# Patient Record
Sex: FEMALE | Race: WHITE | NOT HISPANIC OR LATINO | URBAN - METROPOLITAN AREA
[De-identification: names, ages, dates, MRNs, and addresses within clinical notes are randomized per-mention and may not be internally consistent; named-entity substitution may affect disease eponyms.]

---

## 2017-01-31 ENCOUNTER — EMERGENCY (EMERGENCY)
Facility: HOSPITAL | Age: 47
LOS: 1 days | End: 2017-01-31
Payer: COMMERCIAL

## 2017-01-31 PROCEDURE — 99285 EMERGENCY DEPT VISIT HI MDM: CPT

## 2017-01-31 PROCEDURE — 71010: CPT | Mod: 26

## 2017-12-17 ENCOUNTER — TRANSCRIPTION ENCOUNTER (OUTPATIENT)
Age: 47
End: 2017-12-17

## 2019-04-11 PROBLEM — Z00.00 ENCOUNTER FOR PREVENTIVE HEALTH EXAMINATION: Status: ACTIVE | Noted: 2019-04-11

## 2019-04-12 ENCOUNTER — NON-APPOINTMENT (OUTPATIENT)
Age: 49
End: 2019-04-12

## 2019-04-12 ENCOUNTER — APPOINTMENT (OUTPATIENT)
Dept: CARDIOLOGY | Facility: CLINIC | Age: 49
End: 2019-04-12
Payer: COMMERCIAL

## 2019-04-12 VITALS
BODY MASS INDEX: 24.73 KG/M2 | WEIGHT: 131 LBS | SYSTOLIC BLOOD PRESSURE: 138 MMHG | HEART RATE: 95 BPM | HEIGHT: 61 IN | DIASTOLIC BLOOD PRESSURE: 82 MMHG

## 2019-04-12 DIAGNOSIS — Z78.9 OTHER SPECIFIED HEALTH STATUS: ICD-10-CM

## 2019-04-12 DIAGNOSIS — Z82.49 FAMILY HISTORY OF ISCHEMIC HEART DISEASE AND OTHER DISEASES OF THE CIRCULATORY SYSTEM: ICD-10-CM

## 2019-04-12 DIAGNOSIS — Z85.71 PERSONAL HISTORY OF HODGKIN LYMPHOMA: ICD-10-CM

## 2019-04-12 DIAGNOSIS — Z83.438 FAMILY HISTORY OF OTHER DISORDER OF LIPOPROTEIN METABOLISM AND OTHER LIPIDEMIA: ICD-10-CM

## 2019-04-12 DIAGNOSIS — Z92.3 PERSONAL HISTORY OF IRRADIATION: ICD-10-CM

## 2019-04-12 DIAGNOSIS — Z85.818 PERSONAL HISTORY OF MALIGNANT NEOPLASM OF OTHER SITES OF LIP, ORAL CAVITY, AND PHARYNX: ICD-10-CM

## 2019-04-12 PROCEDURE — 99243 OFF/OP CNSLTJ NEW/EST LOW 30: CPT

## 2019-04-12 PROCEDURE — 93000 ELECTROCARDIOGRAM COMPLETE: CPT

## 2019-04-12 RX ORDER — MULTIVITAMIN
TABLET ORAL
Refills: 0 | Status: ACTIVE | COMMUNITY

## 2019-04-12 RX ORDER — LORAZEPAM 0.5 MG/1
0.5 TABLET ORAL 3 TIMES DAILY
Refills: 0 | Status: ACTIVE | COMMUNITY

## 2019-04-12 RX ORDER — GLUC/MSM/COLGN2/HYAL/ANTIARTH3 375-375-20
TABLET ORAL
Refills: 0 | Status: ACTIVE | COMMUNITY

## 2019-04-12 NOTE — DISCUSSION/SUMMARY
[FreeTextEntry1] : Eliza is a 49-year-old female with medical history detailed above and active medical issues including:\par \par - Recurrent chest pain, dyspnea on exertion, multiple CAD risk factors. Patient will have noninvasive testing with exercise stress echo to assess for obstructive CAD, HR and BP response, exercise-induced arrhythmia, 2DEcho for LVEF, structural heart disease, carotid and abdominal ultrasound to assess for obstructive PAD.\par \par - History of sinus tachycardia with normal cardiology evaluation 2013 including echocardiogram, Holter monitor\par \par - History of Hodgkin's lymphoma radiation therapy 1990, tonsillar cancer surgery and radiation therapy followed by oncologist\par \par - Aranda's esophagus scheduled for upper endoscopy Dr Grace 4/23/19 [facility]\par \par Patient will be seen in cardiology follow-up after noninvasive testing.\par \par Advised patient to follow active lifestyle with regular cardiovascular exercise. Patient educated on lifestyle and diet modification with low sodium low fat diet and avoidance of excessive alcohol. Patient is aware to call with any symptoms or concerns. \par \par Eliza will followup with Dr Rogerio Arauz for primary care. \par \par

## 2019-04-12 NOTE — DISCUSSION/SUMMARY
[FreeTextEntry1] : Eliza is a 49-year-old female with medical history detailed above and active medical issues including:\par \par - Recurrent chest pain, dyspnea on exertion, multiple CAD risk factors. Patient will have noninvasive testing with exercise stress echo to assess for obstructive CAD, HR and BP response, exercise-induced arrhythmia, 2DEcho for LVEF, structural heart disease, carotid and abdominal ultrasound to assess for obstructive PAD.\par \par - History of sinus tachycardia with normal cardiology evaluation 2013 including echocardiogram, Holter monitor\par \par - History of Hodgkin's lymphoma radiation therapy 1990, tonsillar cancer surgery and radiation therapy followed by oncologist\par \par - Aranda's esophagus scheduled for upper endoscopy Dr Grace 4/23/19 [facility]\par \par Patient will be seen in cardiology follow-up after noninvasive testing.\par \par Advised patient to follow active lifestyle with regular cardiovascular exercise. Patient educated on lifestyle and diet modification with low sodium low fat diet and avoidance of excessive alcohol. Patient is aware to call with any symptoms or concerns. \par \par Eliza will followup with Dr Rogerio Arauz for primary care. \par \par

## 2019-04-12 NOTE — PHYSICAL EXAM
[Well Groomed] : well groomed [General Appearance - Well Developed] : well developed [Normal Appearance] : normal appearance [General Appearance - Well Nourished] : well nourished [No Deformities] : no deformities [General Appearance - In No Acute Distress] : no acute distress [Normal Conjunctiva] : the conjunctiva exhibited no abnormalities [Eyelids - No Xanthelasma] : the eyelids demonstrated no xanthelasmas [No Oral Pallor] : no oral pallor [Normal Oral Mucosa] : normal oral mucosa [No Oral Cyanosis] : no oral cyanosis [Normal Jugular Venous A Waves Present] : normal jugular venous A waves present [No Jugular Venous Gusman A Waves] : no jugular venous gusman A waves [Normal Jugular Venous V Waves Present] : normal jugular venous V waves present [Heart Rate And Rhythm] : heart rate and rhythm were normal [Murmurs] : no murmurs present [Heart Sounds] : normal S1 and S2 [Respiration, Rhythm And Depth] : normal respiratory rhythm and effort [Exaggerated Use Of Accessory Muscles For Inspiration] : no accessory muscle use [Abdomen Tenderness] : non-tender [Auscultation Breath Sounds / Voice Sounds] : lungs were clear to auscultation bilaterally [Abdomen Soft] : soft [Abnormal Walk] : normal gait [Abdomen Mass (___ Cm)] : no abdominal mass palpated [Nail Clubbing] : no clubbing of the fingernails [Cyanosis, Localized] : no localized cyanosis [Gait - Sufficient For Exercise Testing] : the gait was sufficient for exercise testing [Petechial Hemorrhages (___cm)] : no petechial hemorrhages [No Venous Stasis] : no venous stasis [Skin Lesions] : no skin lesions [] : no rash [Skin Color & Pigmentation] : normal skin color and pigmentation [No Skin Ulcers] : no skin ulcer [No Xanthoma] : no  xanthoma was observed [Mood] : the mood was normal [Affect] : the affect was normal [Oriented To Time, Place, And Person] : oriented to person, place, and time [No Anxiety] : not feeling anxious

## 2019-04-12 NOTE — REVIEW OF SYSTEMS
[Dyspnea on exertion] : dyspnea during exertion [Chest  Pressure] : chest pressure [Shortness Of Breath] : shortness of breath [Leg Claudication] : no intermittent leg claudication [Lower Ext Edema] : no extremity edema [Chest Pain] : chest pain [Palpitations] : palpitations [Negative] : Endocrine

## 2019-04-12 NOTE — REASON FOR VISIT
[Consultation] : a consultation regarding [FreeTextEntry2] : chest pain, palpitations, sinus tachycardia [FreeTextEntry1] : Eliza is a 49-year-old female with history of sinus tachycardia, Hodgkin's lymphoma radiation therapy 1990, tonsillar cancer surgery and radiation therapy, family history premature CAD.\par \par Patient has dyspnea with moderate exertion.  Chest pain mild pressure sensation lasting several minutes at rest, nonradiating, nonreproducible.  Patient has occasional palpitations once or twice per week no associated symptoms.  Patient has occasional dizziness with head position consistent with vertigo.  Cardiovascular review of symptoms is negative for exertional chest pain or syncope.  No PND or orthopnea leg edema.  No bleeding or black stool.\par \par Patient is walking 20 minutes with exertional chest pain. \par \par Patient is consuming one caffeinated beverage per day and is maintaining adequate oral hydration. \par \par EKG 4/12/19 normal sinus rhythm normal tracing\par \par Echocardiogram August 2013 LV of 60%, mild diastolic dysfunction, mild AI, PI and TR, mild pulmonary hypertension, RVSP 39 mmHg

## 2019-05-03 ENCOUNTER — APPOINTMENT (OUTPATIENT)
Dept: CARDIOLOGY | Facility: CLINIC | Age: 49
End: 2019-05-03
Payer: COMMERCIAL

## 2019-05-03 PROCEDURE — 93880 EXTRACRANIAL BILAT STUDY: CPT

## 2019-05-03 PROCEDURE — 93979 VASCULAR STUDY: CPT

## 2019-05-03 PROCEDURE — 93306 TTE W/DOPPLER COMPLETE: CPT

## 2019-05-17 ENCOUNTER — TRANSCRIPTION ENCOUNTER (OUTPATIENT)
Age: 49
End: 2019-05-17

## 2019-05-24 ENCOUNTER — APPOINTMENT (OUTPATIENT)
Dept: CARDIOLOGY | Facility: CLINIC | Age: 49
End: 2019-05-24

## 2019-07-06 ENCOUNTER — INPATIENT (INPATIENT)
Facility: HOSPITAL | Age: 49
LOS: 4 days | Discharge: HOME | End: 2019-07-11
Attending: FAMILY MEDICINE | Admitting: FAMILY MEDICINE
Payer: COMMERCIAL

## 2019-07-06 ENCOUNTER — OUTPATIENT (OUTPATIENT)
Dept: OUTPATIENT SERVICES | Facility: HOSPITAL | Age: 49
LOS: 1 days | End: 2019-07-06

## 2019-07-06 PROCEDURE — 71046 X-RAY EXAM CHEST 2 VIEWS: CPT | Mod: 26

## 2019-07-06 PROCEDURE — 99285 EMERGENCY DEPT VISIT HI MDM: CPT

## 2019-07-07 ENCOUNTER — OUTPATIENT (OUTPATIENT)
Dept: OUTPATIENT SERVICES | Facility: HOSPITAL | Age: 49
LOS: 1 days | End: 2019-07-07

## 2019-07-07 PROCEDURE — 93010 ELECTROCARDIOGRAM REPORT: CPT

## 2019-07-08 ENCOUNTER — OUTPATIENT (OUTPATIENT)
Dept: OUTPATIENT SERVICES | Facility: HOSPITAL | Age: 49
LOS: 1 days | End: 2019-07-08

## 2019-07-08 PROCEDURE — 71275 CT ANGIOGRAPHY CHEST: CPT | Mod: 26

## 2019-07-09 ENCOUNTER — OUTPATIENT (OUTPATIENT)
Dept: OUTPATIENT SERVICES | Facility: HOSPITAL | Age: 49
LOS: 1 days | End: 2019-07-09

## 2019-07-10 ENCOUNTER — OUTPATIENT (OUTPATIENT)
Dept: OUTPATIENT SERVICES | Facility: HOSPITAL | Age: 49
LOS: 1 days | End: 2019-07-10

## 2019-07-11 ENCOUNTER — OUTPATIENT (OUTPATIENT)
Dept: OUTPATIENT SERVICES | Facility: HOSPITAL | Age: 49
LOS: 1 days | End: 2019-07-11

## 2020-01-06 ENCOUNTER — TRANSCRIPTION ENCOUNTER (OUTPATIENT)
Age: 50
End: 2020-01-06

## 2020-10-02 ENCOUNTER — NON-APPOINTMENT (OUTPATIENT)
Age: 50
End: 2020-10-02

## 2020-10-02 ENCOUNTER — APPOINTMENT (OUTPATIENT)
Dept: CARDIOLOGY | Facility: CLINIC | Age: 50
End: 2020-10-02
Payer: COMMERCIAL

## 2020-10-02 VITALS
WEIGHT: 133 LBS | BODY MASS INDEX: 24.48 KG/M2 | TEMPERATURE: 97.4 F | HEIGHT: 62 IN | HEART RATE: 104 BPM | DIASTOLIC BLOOD PRESSURE: 70 MMHG | SYSTOLIC BLOOD PRESSURE: 112 MMHG | OXYGEN SATURATION: 98 %

## 2020-10-02 PROCEDURE — 93000 ELECTROCARDIOGRAM COMPLETE: CPT

## 2020-10-02 PROCEDURE — 99214 OFFICE O/P EST MOD 30 MIN: CPT

## 2020-10-02 RX ORDER — ESCITALOPRAM OXALATE 10 MG/1
10 TABLET ORAL DAILY
Refills: 0 | Status: DISCONTINUED | COMMUNITY
End: 2020-10-02

## 2020-10-02 RX ORDER — MIRTAZAPINE 15 MG/1
15 TABLET, FILM COATED ORAL
Refills: 0 | Status: ACTIVE | COMMUNITY

## 2020-10-02 RX ORDER — OMEPRAZOLE 40 MG/1
40 CAPSULE, DELAYED RELEASE ORAL
Refills: 0 | Status: ACTIVE | COMMUNITY

## 2020-10-02 NOTE — REVIEW OF SYSTEMS
[Shortness Of Breath] : shortness of breath [Dyspnea on exertion] : dyspnea during exertion [Chest  Pressure] : chest pressure [Chest Pain] : chest pain [Palpitations] : palpitations [Negative] : Heme/Lymph [Lower Ext Edema] : no extremity edema [Leg Claudication] : no intermittent leg claudication

## 2020-10-02 NOTE — PHYSICAL EXAM
[General Appearance - Well Developed] : well developed [Normal Appearance] : normal appearance [Well Groomed] : well groomed [General Appearance - Well Nourished] : well nourished [No Deformities] : no deformities [General Appearance - In No Acute Distress] : no acute distress [Normal Conjunctiva] : the conjunctiva exhibited no abnormalities [Eyelids - No Xanthelasma] : the eyelids demonstrated no xanthelasmas [Normal Oral Mucosa] : normal oral mucosa [No Oral Pallor] : no oral pallor [No Oral Cyanosis] : no oral cyanosis [Normal Jugular Venous A Waves Present] : normal jugular venous A waves present [Normal Jugular Venous V Waves Present] : normal jugular venous V waves present [No Jugular Venous Gusman A Waves] : no jugular venous gusman A waves [Respiration, Rhythm And Depth] : normal respiratory rhythm and effort [Exaggerated Use Of Accessory Muscles For Inspiration] : no accessory muscle use [Auscultation Breath Sounds / Voice Sounds] : lungs were clear to auscultation bilaterally [Heart Rate And Rhythm] : heart rate and rhythm were normal [Heart Sounds] : normal S1 and S2 [Murmurs] : no murmurs present [Abdomen Soft] : soft [Abdomen Tenderness] : non-tender [Abdomen Mass (___ Cm)] : no abdominal mass palpated [Abnormal Walk] : normal gait [Gait - Sufficient For Exercise Testing] : the gait was sufficient for exercise testing [Nail Clubbing] : no clubbing of the fingernails [Cyanosis, Localized] : no localized cyanosis [Petechial Hemorrhages (___cm)] : no petechial hemorrhages [Skin Color & Pigmentation] : normal skin color and pigmentation [] : no rash [No Venous Stasis] : no venous stasis [Skin Lesions] : no skin lesions [No Skin Ulcers] : no skin ulcer [No Xanthoma] : no  xanthoma was observed [Oriented To Time, Place, And Person] : oriented to person, place, and time [Affect] : the affect was normal [Mood] : the mood was normal [No Anxiety] : not feeling anxious

## 2020-10-02 NOTE — DISCUSSION/SUMMARY
[FreeTextEntry1] : Eliza is a 49-year-old female with medical history detailed above and active medical issues including:\par \par - Recurrent chest pain, dyspnea on exertion, multiple CAD risk factors. Patient will have noninvasive testing with exercise stress echo to assess for obstructive CAD, HR and BP response, exercise-induced arrhythmia, 2DEcho for LVEF, structural heart disease, carotid and abdominal ultrasound to assess for obstructive PAD.\par \par - History of sinus tachycardia with normal cardiology evaluation 2013 including echocardiogram, Holter monitor\par \par - History of Hodgkin's lymphoma radiation therapy 1990, tonsillar cancer surgery and radiation therapy followed by oncologist\par \par - Aranda's esophagus scheduled for upper endoscopy Dr Grace 4/23/19 [facility]\par \par - Significant emotional stress  passed away July 2019\par \par Patient will be seen in cardiology follow-up after noninvasive testing.\par \par Advised patient to follow active lifestyle with regular cardiovascular exercise. Patient educated on lifestyle and diet modification with low sodium low fat diet and avoidance of excessive alcohol. Patient is aware to call with any symptoms or concerns. \par \par Eliza will followup with Salvador Brizuela NP for primary care. \par \par

## 2020-10-02 NOTE — REASON FOR VISIT
[Consultation] : a consultation regarding [FreeTextEntry2] : chest pain, palpitations, sinus tachycardia [FreeTextEntry1] : Eliza is a 50-year-old female with history of sinus tachycardia, Hodgkin's lymphoma radiation therapy 1990, tonsillar cancer surgery and radiation therapy, family history premature CAD.\par \par Patient's  passed away July 2019 patient was admitted to [facility].\par \par Patient has occasional chest pain at rest mild tightness lasting several minutes, nonradiating and nonreproducible.  Cardiovascular review of symptoms is negative for exertional chest pain, dyspnea, palpitations, dizziness or syncope.  No PND or orthopnea leg edema.  No bleeding or black stool.\par \par Patient is walking 30 minutes with exertional chest pain. \par \par Patient is consuming one caffeinated beverage per day and is maintaining adequate oral hydration.\par \par Lab September 2020, normal CBC, BMP, T, HbA1c 5.8, fasting cholesterol 211, HDL 64, , triglyceride 90\par \par Echocardiogram [facility] July 2019 LVEF 60%, trace AI and MR, moderate severe TR, moderate pulmonary hypertension \par \par EKG 4/12/19 normal sinus rhythm normal tracing\par \par Echocardiogram August 2013 LV of 60%, mild diastolic dysfunction, mild AI, PI and TR, mild pulmonary hypertension, RVSP 39 mmHg

## 2020-10-12 ENCOUNTER — APPOINTMENT (OUTPATIENT)
Dept: CARDIOLOGY | Facility: CLINIC | Age: 50
End: 2020-10-12
Payer: COMMERCIAL

## 2020-10-12 PROCEDURE — 93306 TTE W/DOPPLER COMPLETE: CPT

## 2020-10-22 ENCOUNTER — APPOINTMENT (OUTPATIENT)
Dept: CARDIOLOGY | Facility: CLINIC | Age: 50
End: 2020-10-22
Payer: COMMERCIAL

## 2020-10-22 PROCEDURE — 99072 ADDL SUPL MATRL&STAF TM PHE: CPT

## 2020-10-22 PROCEDURE — 93351 STRESS TTE COMPLETE: CPT

## 2020-10-29 ENCOUNTER — APPOINTMENT (OUTPATIENT)
Dept: CARDIOLOGY | Facility: CLINIC | Age: 50
End: 2020-10-29
Payer: COMMERCIAL

## 2020-10-29 VITALS
HEART RATE: 112 BPM | SYSTOLIC BLOOD PRESSURE: 114 MMHG | DIASTOLIC BLOOD PRESSURE: 80 MMHG | OXYGEN SATURATION: 98 % | TEMPERATURE: 97.7 F | HEIGHT: 62 IN | WEIGHT: 122 LBS | BODY MASS INDEX: 22.45 KG/M2

## 2020-10-29 PROCEDURE — 99214 OFFICE O/P EST MOD 30 MIN: CPT

## 2020-10-29 PROCEDURE — 99072 ADDL SUPL MATRL&STAF TM PHE: CPT

## 2020-10-29 NOTE — REASON FOR VISIT
[Consultation] : a consultation regarding [FreeTextEntry2] : noninvasive testing for recurrent chest pain, palpitations, sinus tachycardia [FreeTextEntry1] : Eliza is a 50-year-old female with history of sinus tachycardia, Hodgkin's lymphoma radiation therapy 1990, tonsillar cancer surgery and radiation therapy, family history premature CAD, family history of premature CAD father CABG age 52.\par \par Patient's  passed away July 2019 patient was admitted to [facility].\par \par Patient has recurrent chest pain at random times rest and exertion, mild tightness lasting several minutes, nonradiating and nonreproducible.  Cardiovascular review of symptoms is negative for exertional chest pain, dyspnea, palpitations, dizziness or syncope.  No PND or orthopnea leg edema.  No bleeding or black stool.\par \par No exercise routine.  Patient is walking 15 minutes with occasional chest pain. \par \par Patient is consuming one caffeinated beverage per day and is maintaining adequate oral hydration.\par \par Exercise stress echo October 2020, normal LVEF with normal exercise wall motion, nonischemic EKG response, no chest pain, nonischemic EKG response, 95% MPHR, 7 minutes 44 seconds Sourav protocol.\par \par Echocardiogram October 2020, LVEF 60%, moderate MR, MS and AI, moderate severe TR, mild pulmonary hypertension.\par \par Lab September 2020, normal CBC, BMP, T, HbA1c 5.8, fasting cholesterol 211, HDL 64, , triglyceride 90\par \par Echocardiogram [facility] July 2019 LVEF 60%, trace AI and MR, moderate severe TR, moderate pulmonary hypertension \par \par EKG 4/12/19 normal sinus rhythm normal tracing\par \par Echocardiogram August 2013 LV of 60%, mild diastolic dysfunction, mild AI, PI and TR, mild pulmonary hypertension, RVSP 39 mmHg

## 2020-10-29 NOTE — DISCUSSION/SUMMARY
[FreeTextEntry1] : Eliza is a 49-year-old female with medical history detailed above and active medical issues including:\par \par - Recurrent chest pain concerning for angina, dyspnea on exertion, multiple CAD risk factors.  Normal exercise stress echo.  Coronary CTA ordered to evaluate for obstructive CAD. \par \par - History of sinus tachycardia with normal cardiology evaluation 2013 including echocardiogram, Holter monitor\par \par - History of Hodgkin's lymphoma radiation therapy 1990, tonsillar cancer surgery and radiation therapy followed by oncologist\par \par - Aranda's esophagus scheduled for upper endoscopy Dr Grace 4/23/19 [facility]\par \par - Significant emotional stress  passed away July 2019\par \par - Family history of premature CAD father CABG age 52.\par \par Patient will be seen in cardiology follow-up 6 months.  Patient currently not on cardiac medication.  Repeat labs will be ordered with PMD.\par \par Advised patient to follow active lifestyle with regular cardiovascular exercise. Patient educated on lifestyle and diet modification with low sodium low fat diet and avoidance of excessive alcohol. Patient is aware to call with any symptoms or concerns. \par \par Eliza will followup with Heidy Gonsalez for primary care. \par \par

## 2021-04-27 ENCOUNTER — APPOINTMENT (OUTPATIENT)
Dept: CARDIOLOGY | Facility: CLINIC | Age: 51
End: 2021-04-27

## 2021-11-10 ENCOUNTER — TRANSCRIPTION ENCOUNTER (OUTPATIENT)
Age: 51
End: 2021-11-10

## 2021-11-22 ENCOUNTER — EMERGENCY (EMERGENCY)
Facility: HOSPITAL | Age: 51
LOS: 1 days | End: 2021-11-22
Admitting: EMERGENCY MEDICINE
Payer: COMMERCIAL

## 2021-11-22 PROCEDURE — 99283 EMERGENCY DEPT VISIT LOW MDM: CPT

## 2022-08-30 ENCOUNTER — APPOINTMENT (OUTPATIENT)
Dept: CARDIOLOGY | Facility: CLINIC | Age: 52
End: 2022-08-30

## 2022-08-30 VITALS
OXYGEN SATURATION: 98 % | HEART RATE: 111 BPM | DIASTOLIC BLOOD PRESSURE: 82 MMHG | TEMPERATURE: 97.3 F | WEIGHT: 115 LBS | HEIGHT: 62 IN | SYSTOLIC BLOOD PRESSURE: 124 MMHG | BODY MASS INDEX: 21.16 KG/M2

## 2022-08-30 PROCEDURE — 99215 OFFICE O/P EST HI 40 MIN: CPT

## 2022-08-30 RX ORDER — LEVOTHYROXINE SODIUM 112 MCG
112 TABLET ORAL
Refills: 0 | Status: ACTIVE | COMMUNITY

## 2022-08-30 NOTE — DISCUSSION/SUMMARY
[FreeTextEntry1] : Eliza is a 52-year-old female with medical history detailed above and active medical issues including:\par \par - Recurrent chest pain concerning for angina, dyspnea on exertion, multiple CAD risk factors.  Coronary CTA and calcium score ordered to evaluate for obstructive CAD and risk stratification with TEB followup.\par \par - History of sinus tachycardia with normal cardiology evaluation 2013 including echocardiogram, Holter monitor\par \par - History of Hodgkin's lymphoma radiation therapy 1990, tonsillar cancer surgery and radiation therapy followed by oncologist\par \par - Aranda's esophagus scheduled for upper endoscopy Dr Grace 4/23/19 [facility]\par \par - Significant emotional stress  passed away July 2019\par \par - Family history of premature CAD father CABG age 52.\par \par Patient will be seen in cardiology follow-up 6 months.  Patient currently not on cardiac medication.  Repeat labs will be ordered with PMD.\par \par Advised patient to follow active lifestyle with regular cardiovascular exercise. Patient educated on lifestyle and diet modification with low sodium low fat diet and avoidance of excessive alcohol. Patient is aware to call with any symptoms or concerns. \par \par Eliza will followup with Heidy Gonsalez for primary care. \par \par

## 2022-08-30 NOTE — PHYSICAL EXAM
[General Appearance - Well Developed] : well developed [Normal Appearance] : normal appearance [Well Groomed] : well groomed [General Appearance - Well Nourished] : well nourished [No Deformities] : no deformities [General Appearance - In No Acute Distress] : no acute distress [Eyelids - No Xanthelasma] : the eyelids demonstrated no xanthelasmas [Normal Oral Mucosa] : normal oral mucosa [No Oral Pallor] : no oral pallor [No Oral Cyanosis] : no oral cyanosis [Normal Jugular Venous A Waves Present] : normal jugular venous A waves present [Normal Jugular Venous V Waves Present] : normal jugular venous V waves present [No Jugular Venous Gusman A Waves] : no jugular venous gusman A waves [Respiration, Rhythm And Depth] : normal respiratory rhythm and effort [Exaggerated Use Of Accessory Muscles For Inspiration] : no accessory muscle use [Auscultation Breath Sounds / Voice Sounds] : lungs were clear to auscultation bilaterally [Heart Rate And Rhythm] : heart rate and rhythm were normal [Heart Sounds] : normal S1 and S2 [Murmurs] : no murmurs present [Abdomen Soft] : soft [Abdomen Tenderness] : non-tender [Abdomen Mass (___ Cm)] : no abdominal mass palpated [Abnormal Walk] : normal gait [Gait - Sufficient For Exercise Testing] : the gait was sufficient for exercise testing [Nail Clubbing] : no clubbing of the fingernails [Cyanosis, Localized] : no localized cyanosis [Petechial Hemorrhages (___cm)] : no petechial hemorrhages [Skin Color & Pigmentation] : normal skin color and pigmentation [] : no rash [No Venous Stasis] : no venous stasis [Skin Lesions] : no skin lesions [No Skin Ulcers] : no skin ulcer [No Xanthoma] : no  xanthoma was observed [Oriented To Time, Place, And Person] : oriented to person, place, and time [Affect] : the affect was normal [Mood] : the mood was normal [No Anxiety] : not feeling anxious [Well Developed] : well developed [Well Nourished] : well nourished [No Acute Distress] : no acute distress [Normal Conjunctiva] : normal conjunctiva [Normal Venous Pressure] : normal venous pressure [No Carotid Bruit] : no carotid bruit [Normal S1, S2] : normal S1, S2 [No Murmur] : no murmur [No Rub] : no rub [No Gallop] : no gallop [Clear Lung Fields] : clear lung fields [Good Air Entry] : good air entry [No Respiratory Distress] : no respiratory distress  [Soft] : abdomen soft [Non Tender] : non-tender [No Masses/organomegaly] : no masses/organomegaly [Normal Bowel Sounds] : normal bowel sounds [Normal Gait] : normal gait [No Edema] : no edema [No Cyanosis] : no cyanosis [No Clubbing] : no clubbing [No Varicosities] : no varicosities [No Rash] : no rash [No Skin Lesions] : no skin lesions [Moves all extremities] : moves all extremities [No Focal Deficits] : no focal deficits [Normal Speech] : normal speech [Alert and Oriented] : alert and oriented [Normal memory] : normal memory

## 2022-08-30 NOTE — REASON FOR VISIT
[Consultation] : a consultation regarding [FreeTextEntry1] : Eliza is a 52-year-old female with history of sinus tachycardia, Hodgkin's lymphoma radiation therapy 1990, tonsillar cancer surgery and radiation therapy, family history premature CAD, family history of premature CAD father CABG age 52.\par \par Patient's  passed away July 2019 patient was admitted to [facility].\par \par Patient has recurrent chest pain at random times rest and exertion, mild tightness lasting several minutes, nonradiating and nonreproducible.  Cardiovascular review of symptoms is negative for exertional chest pain, dyspnea, palpitations, dizziness or syncope.  No PND or orthopnea leg edema.  No bleeding or black stool.\par \par No exercise routine.  Patient is walking 15 minutes with occasional chest pain. \par \par Patient is consuming one caffeinated beverage per day and is maintaining adequate oral hydration.\par \par Exercise stress echo October 2020, normal LVEF with normal exercise wall motion, nonischemic EKG response, no chest pain, nonischemic EKG response, 95% MPHR, 7 minutes 44 seconds Sourav protocol.\par \par Echocardiogram October 2020, LVEF 60%, moderate MR, MS and AI, moderate severe TR, mild pulmonary hypertension.\par \par Lab September 2020, normal CBC, BMP, T, HbA1c 5.8, fasting cholesterol 211, HDL 64, , triglyceride 90\par \par Echocardiogram [facility] July 2019 LVEF 60%, trace AI and MR, moderate severe TR, moderate pulmonary hypertension \par \par EKG 4/12/19 normal sinus rhythm normal tracing\par \par Echocardiogram August 2013 LV of 60%, mild diastolic dysfunction, mild AI, PI and TR, mild pulmonary hypertension, RVSP 39 mmHg [FreeTextEntry2] : noninvasive testing for recurrent chest pain, palpitations, sinus tachycardia

## 2022-09-15 ENCOUNTER — APPOINTMENT (OUTPATIENT)
Dept: CARDIOLOGY | Facility: CLINIC | Age: 52
End: 2022-09-15

## 2022-09-15 PROCEDURE — 93306 TTE W/DOPPLER COMPLETE: CPT

## 2022-09-15 PROCEDURE — 93979 VASCULAR STUDY: CPT

## 2022-09-15 PROCEDURE — 93880 EXTRACRANIAL BILAT STUDY: CPT

## 2022-09-19 ENCOUNTER — APPOINTMENT (OUTPATIENT)
Dept: CARDIOLOGY | Facility: CLINIC | Age: 52
End: 2022-09-19

## 2022-09-19 NOTE — DISCUSSION/SUMMARY
[FreeTextEntry1] : Eliza is a 52-year-old female with medical history detailed above and active medical issues including:\par \par - Recurrent chest pain concerning for angina, dyspnea on exertion, multiple CAD risk factors.  Coronary CTA and calcium score ordered to evaluate for obstructive CAD and risk stratification with TEB followup.\par \par - History of sinus tachycardia with normal cardiology evaluation 2013 including echocardiogram, Holter monitor\par \par - History of Hodgkin's lymphoma radiation therapy 1990, tonsillar cancer surgery and radiation therapy followed by oncologist\par \par - Aranda's esophagus scheduled for upper endoscopy Dr Grace 4/23/19 [facility]\par \par - Significant emotional stress  passed away July 2019\par \par - Family history of premature CAD father CABG age 52.\par \par Patient will be seen in cardiology follow-up 6 months.  Patient currently not on cardiac medication.  Repeat labs will be ordered with PMD.\par \par Advised patient to follow active lifestyle with regular cardiovascular exercise. Patient educated on lifestyle and diet modification with low sodium low fat diet and avoidance of excessive alcohol. Patient is aware to call with any symptoms or concerns. \par \par Eliza will followup with Heidy Gonsalez for primary care. \par \par

## 2022-09-19 NOTE — REASON FOR VISIT
[FreeTextEntry1] : Eliza is a 52-year-old female with history of sinus tachycardia, Hodgkin's lymphoma radiation therapy 1990, tonsillar cancer surgery and radiation therapy, family history premature CAD, family history of premature CAD father CABG age 52.\par \par Patient's  passed away July 2019 patient was admitted to [facility].\par \par Patient has recurrent chest pain at random times rest and exertion, mild tightness lasting several minutes, nonradiating and nonreproducible.  Cardiovascular review of symptoms is negative for exertional chest pain, dyspnea, palpitations, dizziness or syncope.  No PND or orthopnea leg edema.  No bleeding or black stool.\par \par No exercise routine.  Patient is walking 15 minutes with occasional chest pain. \par \par Patient is consuming one caffeinated beverage per day and is maintaining adequate oral hydration.\par \par Exercise stress echo October 2020, normal LVEF with normal exercise wall motion, nonischemic EKG response, no chest pain, nonischemic EKG response, 95% MPHR, 7 minutes 44 seconds Sourav protocol.\par \par Echocardiogram October 2020, LVEF 60%, moderate MR, MS and AI, moderate severe TR, mild pulmonary hypertension.\par \par Lab September 2020, normal CBC, BMP, T, HbA1c 5.8, fasting cholesterol 211, HDL 64, , triglyceride 90\par \par Echocardiogram [facility] July 2019 LVEF 60%, trace AI and MR, moderate severe TR, moderate pulmonary hypertension \par \par EKG 4/12/19 normal sinus rhythm normal tracing\par \par Echocardiogram August 2013 LV of 60%, mild diastolic dysfunction, mild AI, PI and TR, mild pulmonary hypertension, RVSP 39 mmHg [Consultation] : a consultation regarding [FreeTextEntry2] : noninvasive testing for recurrent chest pain, palpitations, sinus tachycardia

## 2022-09-19 NOTE — PHYSICAL EXAM
[Well Developed] : well developed [Well Nourished] : well nourished [No Acute Distress] : no acute distress [Normal Venous Pressure] : normal venous pressure [No Carotid Bruit] : no carotid bruit [Normal S1, S2] : normal S1, S2 [No Murmur] : no murmur [No Rub] : no rub [No Gallop] : no gallop [Clear Lung Fields] : clear lung fields [Good Air Entry] : good air entry [No Respiratory Distress] : no respiratory distress  [Soft] : abdomen soft [Non Tender] : non-tender [No Masses/organomegaly] : no masses/organomegaly [Normal Bowel Sounds] : normal bowel sounds [Normal Gait] : normal gait [No Edema] : no edema [No Cyanosis] : no cyanosis [No Clubbing] : no clubbing [No Varicosities] : no varicosities [No Rash] : no rash [No Skin Lesions] : no skin lesions [Moves all extremities] : moves all extremities [No Focal Deficits] : no focal deficits [Normal Speech] : normal speech [Alert and Oriented] : alert and oriented [Normal memory] : normal memory [General Appearance - Well Developed] : well developed [Normal Appearance] : normal appearance [Well Groomed] : well groomed [General Appearance - Well Nourished] : well nourished [No Deformities] : no deformities [General Appearance - In No Acute Distress] : no acute distress [Normal Conjunctiva] : the conjunctiva exhibited no abnormalities [Eyelids - No Xanthelasma] : the eyelids demonstrated no xanthelasmas [Normal Oral Mucosa] : normal oral mucosa [No Oral Pallor] : no oral pallor [No Oral Cyanosis] : no oral cyanosis [Normal Jugular Venous A Waves Present] : normal jugular venous A waves present [Normal Jugular Venous V Waves Present] : normal jugular venous V waves present [No Jugular Venous Gusman A Waves] : no jugular venous gusman A waves [Respiration, Rhythm And Depth] : normal respiratory rhythm and effort [Exaggerated Use Of Accessory Muscles For Inspiration] : no accessory muscle use [Auscultation Breath Sounds / Voice Sounds] : lungs were clear to auscultation bilaterally [Heart Rate And Rhythm] : heart rate and rhythm were normal [Heart Sounds] : normal S1 and S2 [Murmurs] : no murmurs present [Abdomen Soft] : soft [Abdomen Tenderness] : non-tender [Abdomen Mass (___ Cm)] : no abdominal mass palpated [Abnormal Walk] : normal gait [Gait - Sufficient For Exercise Testing] : the gait was sufficient for exercise testing [Nail Clubbing] : no clubbing of the fingernails [Cyanosis, Localized] : no localized cyanosis [Petechial Hemorrhages (___cm)] : no petechial hemorrhages [Skin Color & Pigmentation] : normal skin color and pigmentation [] : no rash [No Venous Stasis] : no venous stasis [Skin Lesions] : no skin lesions [No Skin Ulcers] : no skin ulcer [No Xanthoma] : no  xanthoma was observed [Oriented To Time, Place, And Person] : oriented to person, place, and time [Affect] : the affect was normal [Mood] : the mood was normal [No Anxiety] : not feeling anxious

## 2022-09-29 PROBLEM — K21.9 GERD (GASTROESOPHAGEAL REFLUX DISEASE): Status: ACTIVE | Noted: 2019-04-12

## 2022-09-29 PROBLEM — Z82.49 FAMILY HISTORY OF MYOCARDIAL INFARCTION: Status: ACTIVE | Noted: 2020-10-29

## 2022-09-29 PROBLEM — R07.89 CHEST PAIN, MIDSTERNAL: Status: ACTIVE | Noted: 2020-10-02

## 2022-09-29 PROBLEM — R06.02 SHORTNESS OF BREATH ON EXERTION: Status: ACTIVE | Noted: 2019-04-12

## 2022-09-29 PROBLEM — E03.9 HYPOTHYROIDISM: Status: ACTIVE | Noted: 2019-04-12

## 2022-09-29 PROBLEM — R00.0 TACHYCARDIA: Status: ACTIVE | Noted: 2019-04-12

## 2022-09-29 PROBLEM — K22.70 BARRETT'S ESOPHAGUS: Status: ACTIVE | Noted: 2019-04-12

## 2022-10-03 ENCOUNTER — APPOINTMENT (OUTPATIENT)
Dept: CARDIOLOGY | Facility: CLINIC | Age: 52
End: 2022-10-03

## 2022-10-03 DIAGNOSIS — R00.0 TACHYCARDIA, UNSPECIFIED: ICD-10-CM

## 2022-10-03 DIAGNOSIS — R07.89 OTHER CHEST PAIN: ICD-10-CM

## 2022-10-03 DIAGNOSIS — Z82.49 FAMILY HISTORY OF ISCHEMIC HEART DISEASE AND OTHER DISEASES OF THE CIRCULATORY SYSTEM: ICD-10-CM

## 2022-10-03 DIAGNOSIS — I34.0 NONRHEUMATIC MITRAL (VALVE) INSUFFICIENCY: ICD-10-CM

## 2022-10-03 DIAGNOSIS — R06.02 SHORTNESS OF BREATH: ICD-10-CM

## 2022-10-03 DIAGNOSIS — E03.9 HYPOTHYROIDISM, UNSPECIFIED: ICD-10-CM

## 2022-10-03 DIAGNOSIS — I35.1 NONRHEUMATIC AORTIC (VALVE) INSUFFICIENCY: ICD-10-CM

## 2022-10-03 DIAGNOSIS — K21.9 GASTRO-ESOPHAGEAL REFLUX DISEASE W/OUT ESOPHAGITIS: ICD-10-CM

## 2022-10-03 DIAGNOSIS — K22.70 BARRETT'S ESOPHAGUS W/OUT DYSPLASIA: ICD-10-CM

## 2022-10-03 PROCEDURE — 99213 OFFICE O/P EST LOW 20 MIN: CPT | Mod: 95

## 2022-10-03 RX ORDER — ROSUVASTATIN CALCIUM 20 MG/1
20 TABLET, FILM COATED ORAL
Qty: 90 | Refills: 1 | Status: ACTIVE | COMMUNITY
Start: 2022-10-03 | End: 1900-01-01

## 2022-10-03 RX ORDER — DESVENLAFAXINE SUCCINATE 50 MG/1
50 TABLET, EXTENDED RELEASE ORAL
Refills: 0 | Status: COMPLETED | COMMUNITY
End: 2022-10-03

## 2022-10-03 RX ORDER — ASPIRIN ENTERIC COATED TABLETS 81 MG 81 MG/1
81 TABLET, DELAYED RELEASE ORAL DAILY
Qty: 90 | Refills: 3 | Status: ACTIVE | COMMUNITY
Start: 2022-10-03 | End: 1900-01-01

## 2022-10-03 RX ORDER — DESVENLAFAXINE SUCCINATE 100 MG/1
100 TABLET, EXTENDED RELEASE ORAL
Refills: 0 | Status: ACTIVE | COMMUNITY

## 2022-10-03 NOTE — REASON FOR VISIT
[Consultation] : a consultation regarding [FreeTextEntry1] : Eliza is a 52-year-old female with history of sinus tachycardia, Hodgkin's lymphoma radiation therapy 1990, tonsillar cancer surgery and radiation therapy, family history premature CAD, family history of premature CAD father CABG age 52.\par \par Patient's  passed away July 2019 patient was admitted to [facility].\par \par Patient has recurrent chest pain at random times rest and exertion, mild tightness lasting several minutes, nonradiating and nonreproducible.  Cardiovascular review of symptoms is negative for exertional chest pain, dyspnea, palpitations, dizziness or syncope.  No PND or orthopnea leg edema.  No bleeding or black stool.\par \par No exercise routine.  Patient is walking 15 minutes with occasional chest pain. \par \par Patient is consuming one caffeinated beverage per day and is maintaining adequate oral hydration.\par \par Coronary CTA and coronary calcium score Sept 2022 total calcium score 458, , LCx 144, , mild obstructive CAD percent LAD and 20% LCX and RCA, LVEF 60%\par \par Echocardiogram Sept 2022 LVEF 60%, moderate to severe eccentric MR and moderate to severe AI, mild-moderate TR, normal RVSP.\par \par Carotid and abdominal ultrasound Sept 2022, mild nonobstructive plaque, normal abdominal aortic size. \par \par Exercise stress echo October 2020, normal LVEF with normal exercise wall motion, nonischemic EKG response, no chest pain, nonischemic EKG response, 95% MPHR, 7 minutes 44 seconds Sourav protocol.\par \par Echocardiogram October 2020, LVEF 60%, moderate MR, MS and AI, moderate severe TR, mild pulmonary hypertension.\par \par Lab September 2020, normal CBC, BMP, T, HbA1c 5.8, fasting cholesterol 211, HDL 64, , triglyceride 90\par \par Echocardiogram [facility] July 2019 LVEF 60%, trace AI and MR, moderate severe TR, moderate pulmonary hypertension \par \par EKG 4/12/19 normal sinus rhythm normal tracing\par \par Echocardiogram August 2013 LV of 60%, mild diastolic dysfunction, mild AI, PI and TR, mild pulmonary hypertension, RVSP 39 mmHg [FreeTextEntry2] : noninvasive testing for recurrent chest pain, palpitations, sinus tachycardia

## 2022-10-03 NOTE — DISCUSSION/SUMMARY
[FreeTextEntry1] : Eliza is a 52-year-old female with medical history detailed above and active medical issues including:\par \par - Recurrent chest pain concerning for angina, dyspnea on exertion, multiple CAD risk factors.  High risk coronary calcium score with mild obstructive CAD.  Risks and options of cardiac catheterization have been discussed, including the risk of bleeding stroke heart attack.  Patient wishes to proceed and will be scheduled for catheterization within one week.  Aspirin will be continued until catheterization.  Persistent chest pain patient will call 911 and go to the emergency room.  Crestor 20 Mg daily started with repeat labs.\par \par - Moderate to severe valvular heart disease, transesophageal echo ordered to investigate further. \par \par - History of sinus tachycardia average resting heart rate below 100, normotensive, continue observation\par \par - History of Hodgkin's lymphoma radiation therapy 1990, tonsillar cancer surgery and radiation therapy followed by oncologist\par \par - Aranda's esophagus scheduled for upper endoscopy Dr Grace 4/23/19 [facility]\par \par - Significant emotional stress  passed away July 2019\par \par - Family history of premature CAD father CABG age 52.\par \par Patient will be seen in cardiology follow-up 1 month.  Patient currently not on cardiac medication.  Repeat labs will be ordered with PMD.\par \par Advised patient to follow active lifestyle with regular cardiovascular exercise. Patient educated on lifestyle and diet modification with low sodium low fat diet and avoidance of excessive alcohol. Patient is aware to call with any symptoms or concerns. \par \par Eliza will followup with Heidy Gonsalez for primary care. \par \par

## 2022-10-21 ENCOUNTER — NON-APPOINTMENT (OUTPATIENT)
Age: 52
End: 2022-10-21

## 2023-07-14 ENCOUNTER — NON-APPOINTMENT (OUTPATIENT)
Age: 53
End: 2023-07-14

## 2023-10-01 PROBLEM — Z92.3 HISTORY OF RADIATION THERAPY: Status: RESOLVED | Noted: 2019-04-12 | Resolved: 2023-10-01

## 2023-10-07 ENCOUNTER — NON-APPOINTMENT (OUTPATIENT)
Age: 53
End: 2023-10-07

## 2023-11-02 ENCOUNTER — APPOINTMENT (OUTPATIENT)
Dept: OBGYN | Facility: CLINIC | Age: 53
End: 2023-11-02

## 2024-08-02 PROBLEM — Z85.71 HISTORY OF HODGKIN'S LYMPHOMA: Status: RESOLVED | Noted: 2019-04-12 | Resolved: 2024-08-02

## 2025-03-02 ENCOUNTER — NON-APPOINTMENT (OUTPATIENT)
Age: 55
End: 2025-03-02